# Patient Record
Sex: FEMALE | Race: WHITE | Employment: FULL TIME | URBAN - METROPOLITAN AREA
[De-identification: names, ages, dates, MRNs, and addresses within clinical notes are randomized per-mention and may not be internally consistent; named-entity substitution may affect disease eponyms.]

---

## 2018-10-13 ENCOUNTER — HOSPITAL ENCOUNTER (EMERGENCY)
Facility: CLINIC | Age: 18
Discharge: HOME | End: 2018-10-13
Attending: PHYSICIAN ASSISTANT | Admitting: PHYSICIAN ASSISTANT
Payer: COMMERCIAL

## 2018-10-13 VITALS
HEIGHT: 64 IN | HEART RATE: 111 BPM | BODY MASS INDEX: 50.02 KG/M2 | OXYGEN SATURATION: 99 % | WEIGHT: 293 LBS | DIASTOLIC BLOOD PRESSURE: 89 MMHG | SYSTOLIC BLOOD PRESSURE: 142 MMHG | TEMPERATURE: 97.9 F | RESPIRATION RATE: 20 BRPM

## 2018-10-13 DIAGNOSIS — K12.0 CANKER SORES ORAL: ICD-10-CM

## 2018-10-13 PROCEDURE — 99282 EMERGENCY DEPT VISIT SF MDM: CPT

## 2018-10-13 RX ORDER — DIPHENHYDRAMINE HYDROCHLORIDE AND LIDOCAINE HYDROCHLORIDE AND ALUMINUM HYDROXIDE AND MAGNESIUM HYDRO
5-10 KIT EVERY 6 HOURS PRN
Qty: 237 ML | Refills: 1 | Status: SHIPPED | OUTPATIENT
Start: 2018-10-13 | End: 2019-02-18

## 2018-10-13 ASSESSMENT — ENCOUNTER SYMPTOMS: WOUND: 1

## 2018-10-13 NOTE — ED AVS SNAPSHOT
Emergency Department    [address] [facility] [address]    Phone:  [phone]    Fax:  [phone]                                       Katarina Sierra   MRN: [id number]    Department:   Emergency Department   Date of Visit:  10/13/2018           After Visit Summary Signature Page     I have received my discharge instructions, and my questions have been answered. I have discussed any challenges I see with this plan with the nurse or doctor.    ..........................................................................................................................................  Patient/Patient Representative Signature      ..........................................................................................................................................  Patient Representative Print Name and Relationship to Patient    ..................................................               ................................................  Date                                   Time    ..........................................................................................................................................  Reviewed by Signature/Title    ...................................................              ..............................................  Date                                               Time          22EPIC Rev 08/18

## 2018-10-13 NOTE — ED AVS SNAPSHOT
Emergency Department    [address] [facility] [address]    Phone:  [phone]    Fax:  [phone]                                       Katarina Sierra   MRN: [id number]    Department:   Emergency Department   Date of Visit:  10/13/2018           Patient Information     Date Of Birth          2000        Your diagnoses for this visit were:     Canker sores oral        You were seen by Margaret Mendez PA-C.      Follow-up Information     Follow up with Primary Care Provider In 1 week.    Why:  As needed        Follow up with  Emergency Department.    Specialty:  EMERGENCY MEDICINE    Why:  As needed, If symptoms worsen    Contact information:    [address] [facility] [address] [phone]        Discharge Instructions         Understanding Canker Sores  Canker sores are small, painful sores inside the mouth. They occur most often on the tongue, gums, or insides of the cheeks. The medical term for canker sores is aphthous ulcers.  What causes a canker sore?  The exact cause of canker sores is not known, but they are linked to a number of conditions. These include:    An injury or irritation in the mouth, such as biting the inside of your cheek or braces rubbing    Allergy or sensitivity to certain foods or substances, such as citrus juice or some kinds of toothpaste    Poor nutrition    Emotional stress    Certain infections and illnesses  Canker sores tend to run in families.  What are the symptoms of a canker sore?  These are some common traits of canker sores:    Sores are open and grayish-yellow, surrounded by redness.    Sores are usually painful and sensitive to touch.    Canker sores may be preceded by a burning or tingling sensation a few hours to a few days before the sore appears.    Children and teens tend to get canker sores more often than adults.  How are canker sores treated?  Canker sores usually go away by themselves within 10 to  14 days. There is no cure for canker sores. Treatment focuses on relieving symptoms and shortening outbreaks. Treatments may include:    Prescription or over-the-counter skin treatments to apply to the sores. Steroids for your skin (topical) may protect the canker sores from further irritation and allow them to heal. Topical pain relief medicines may numb the area and make the sores less painful.    Certain types of toothpaste. These do not contain sodium lauryl sulfate. This type of toothpaste may prevent further aggravation of canker sores.    Oral prescription medicines. These are used for severe cases to help relieve symptoms.    Prescription or over-the-counter pain medicines. These help with discomfort.  What are the complications of a canker sore?  Mouth sores that seem to be canker sores can be signs of a more serious illness. If you have other signs of illness along with mouth sores, you should talk with a healthcare provider. Canker sores can be so painful that they interfere with talking, eating, or drinking.  When should I call my healthcare provider?  Call your healthcare provider right away if you have any of these:    Canker sores that don t go away after 2 weeks    Canker sores that come back more than 3 times a year    Canker sores that are larger than about a half-inch across    Fever of 100.4 F (38 C) or higher, or as directed    Pain that gets worse    You aren t able to eat or drink because of painful sores    Symptoms that don t get better, or symptoms that get worse    New symptoms   Date Last Reviewed: 5/1/2016 2000-2017 [employer]. [address] [place] [address], [place] [address]. All rights reserved. This information is not intended as a substitute for professional medical care. Always follow your healthcare professional's instructions.          24 Hour Appointment Hotline       To make an appointment at any [facility], call [phone] ([phone]). If you don't have  a family doctor or clinic, we will help you find one. [facility] clinics are conveniently located to serve the needs of you and your family.             Review of your medicines      START taking        Dose / Directions Last dose taken    magic mouthwash suspension (diphenhydrAMINE, lidocaine, aluminum-magnesium & simethicone) Susp compounding kit   Dose:  5-10 mL   Quantity:  237 mL        Swish and swallow 5-10 mLs in mouth every 6 hours as needed for mouth sores   Refills:  1                Prescriptions were sent or printed at these locations (1 Prescription)                   Other Prescriptions                Printed at Department/Unit printer (1 of 1)         magic mouthwash (FIRST-MOUTHWASH BLM) suspension                Orders Needing Specimen Collection     None      Pending Results     No orders found from 10/11/2018 to 10/14/2018.            Pending Culture Results     No orders found from 10/11/2018 to 10/14/2018.            Pending Results Instructions     If you had any lab results that were not finalized at the time of your Discharge, you can call the ED Lab Result RN at [phone]. You will be contacted by this team for any positive Lab results or changes in treatment. The nurses are available 7 days a week from 10A to 6:30P.  You can leave a message 24 hours per day and they will return your call.        Test Results From Your Hospital Stay               Clinical Quality Measure: Blood Pressure Screening     Your blood pressure was checked while you were in the emergency department today. The last reading we obtained was  BP: (!) 160/111 . Please read the guidelines below about what these numbers mean and what you should do about them.  If your systolic blood pressure (the top number) is less than 120 and your diastolic blood pressure (the bottom number) is less than 80, then your blood pressure is normal. There is nothing more that you need to do about it.  If your systolic blood pressure (the  "top number) is 120-139 or your diastolic blood pressure (the bottom number) is 80-89, your blood pressure may be higher than it should be. You should have your blood pressure rechecked within a year by a primary care provider.  If your systolic blood pressure (the top number) is 140 or greater or your diastolic blood pressure (the bottom number) is 90 or greater, you may have high blood pressure. High blood pressure is treatable, but if left untreated over time it can put you at risk for heart attack, stroke, or kidney failure. You should have your blood pressure rechecked by a primary care provider within the next 4 weeks.  If your provider in the emergency department today gave you specific instructions to follow-up with your doctor or provider even sooner than that, you should follow that instruction and not wait for up to 4 weeks for your follow-up visit.        Thank you for choosing [facility]       Thank you for choosing [facility] for your care. Our goal is always to provide you with excellent care. Hearing back from our patients is one way we can continue to improve our services. Please take a few minutes to complete the written survey that you may receive in the mail after you visit with us. Thank you!        [employer] Information     [employer] lets you send messages to your doctor, view your test results, renew your prescriptions, schedule appointments and more. To sign up, go to [email or web address] . Click on \"Log in\" on the left side of the screen, which will take you to the Welcome page. Then click on \"Sign up Now\" on the right side of the page.     You will be asked to enter the access code listed below, as well as some personal information. Please follow the directions to create your username and password.     Your access code is: [email or web address]  Expires: 2019  1:49 PM     Your access code will  in 90 days. If you need help or a new code, please call your [facility] clinic or [phone].      "   Care EveryWhere ID     This is your Care EveryWhere ID. This could be used by other organizations to access your [facility] medical records  [id number]        Equal Access to Services     SUSANNA SMITH : Elsa Blackmon, dora ray, carolee ayon, brendon bermudez. So [facility] [phone].    ATENCIÓN: Si habla español, tiene a sepulveda disposición servicios gratuitos de asistencia lingüística. Llame al [phone].    We comply with applicable federal civil rights laws and [place] laws. We do not discriminate on the basis of race, color, national origin, age, disability, sex, sexual orientation, or gender identity.            After Visit Summary       This is your record. Keep this with you and show to your community pharmacist(s) and doctor(s) at your next visit.

## 2018-10-13 NOTE — ED PROVIDER NOTES
"  History     Chief Complaint:  Mouth Lesions     HPI   Katarina Sierra is a 18 year old female, otherwise healthy, who presents to the emergency department today with a mouth lesion. The patient has a left cheek mouth sore with its onset being 2-3 days ago. The patient denies ever having a painful sore like this before. The patient denies any dietary or medication changes, fevers or chills. She denies having sores anywhere else on her body.     Allergies:  No Known Drug Allergies    Medications:    The patient is currently on no regular medications.    Past Medical History:    Obesity     Past Surgical History:    Cholecystectomy     Family History:    History reviewed. No pertinent family history.     Social History:  The patient was accompanied to the ED by her significant other.  Smoking Status: Never smoker   Smokeless Tobacco: Never used   Alcohol Use: No   Marital Status:       Review of Systems   Skin: Positive for wound (lesion of left cheek ).   All other systems reviewed and are negative.    Physical Exam   First Vitals:  Patient Vitals for the past 24 hrs:   BP Temp Temp src Pulse Resp SpO2 Height Weight   10/13/18 1401 - - - - - 99 % - -   10/13/18 1400 142/89 - - - - - - -   10/13/18 1337 - 97.9  F (36.6  C) Oral - - - - -   10/13/18 1331 (!) 160/111 - - 111 20 98 % 1.626 m (5' 4\") 142.4 kg (314 lb)       Physical Exam   Constitutional: Alert, attentive, GCS 15   Eyes: EOM intact. The pupils are equal, round, and reactive to light. No scleral icterus.  ENT:                                      Ears: The external ears are normal. TM's non-erythematous. External canals normal.    Nose: The external nose is normal.  Throat:  The oropharynx is normal. Mucus membranes are moist. 1cm shallow white ulcer to the mucosa of left cheek.   CV:  2+ radial pulse,   Pulm: No respiratory distress  MSK: Full ROM throughout   Neurological: Alert, attentive  Strength and sensation grossly intact   Skin: Skin " is warm and dry.  Psych: Normal mood and affect     Emergency Department Course   Emergency Department Course:  Nursing notes and vitals reviewed.  1340: I performed an exam of the patient as documented above.     Findings and plan explained to the Patient and significant other. Patient discharged home with instructions regarding supportive care, medications, and reasons to return. The importance of close follow-up was reviewed.    I personally answered all related questions prior to discharge.    Impression & Plan    Medical Decision Making:  Katarina Sierra is a 18 year old female presents with a mouth lesion that she noticed 2-3 days ago.    Differential included herpes simplex, lichen planus, oral candidiasis, oral leukoplakia, among others. Physical exam most consistent with aphthous ulcer.  Plan is for Magic mouthwash, avoiding spicy or irritant foods, and orajel for symptomatic relief.  Plans follow-up with primary care provider should lesion persist in the coming week.  All questions/concerns addressed prior to discharge home.      Diagnosis:    ICD-10-CM    1. Canker sores oral K12.0        Disposition:  discharged to home    Discharge Medications:  Discharge Medication List as of 10/13/2018  1:59 PM      START taking these medications    Details   magic mouthwash (FIRST-MOUTHWASH BLM) suspension Swish and swallow 5-10 mLs in mouth every 6 hours as needed for mouth sores, Disp-237 mL, R-1, Local Print           Mike Kline  10/13/2018    EMERGENCY DEPARTMENT  Scribe Disclosure:  I, Mike Kline, am serving as a scribe at 1:34 PM on 10/13/2018 to document services personally performed by Margaret Mendez PA-C based on my observations and the provider's statements to me.        Margaret Mendez PA-C  10/13/18 1523

## 2018-10-13 NOTE — DISCHARGE INSTRUCTIONS
Understanding Canker Sores  Canker sores are small, painful sores inside the mouth. They occur most often on the tongue, gums, or insides of the cheeks. The medical term for canker sores is aphthous ulcers.  What causes a canker sore?  The exact cause of canker sores is not known, but they are linked to a number of conditions. These include:    An injury or irritation in the mouth, such as biting the inside of your cheek or braces rubbing    Allergy or sensitivity to certain foods or substances, such as citrus juice or some kinds of toothpaste    Poor nutrition    Emotional stress    Certain infections and illnesses  Canker sores tend to run in families.  What are the symptoms of a canker sore?  These are some common traits of canker sores:    Sores are open and grayish-yellow, surrounded by redness.    Sores are usually painful and sensitive to touch.    Canker sores may be preceded by a burning or tingling sensation a few hours to a few days before the sore appears.    Children and teens tend to get canker sores more often than adults.  How are canker sores treated?  Canker sores usually go away by themselves within 10 to 14 days. There is no cure for canker sores. Treatment focuses on relieving symptoms and shortening outbreaks. Treatments may include:    Prescription or over-the-counter skin treatments to apply to the sores. Steroids for your skin (topical) may protect the canker sores from further irritation and allow them to heal. Topical pain relief medicines may numb the area and make the sores less painful.    Certain types of toothpaste. These do not contain sodium lauryl sulfate. This type of toothpaste may prevent further aggravation of canker sores.    Oral prescription medicines. These are used for severe cases to help relieve symptoms.    Prescription or over-the-counter pain medicines. These help with discomfort.  What are the complications of a canker sore?  Mouth sores that seem to be canker  sores can be signs of a more serious illness. If you have other signs of illness along with mouth sores, you should talk with a healthcare provider. Canker sores can be so painful that they interfere with talking, eating, or drinking.  When should I call my healthcare provider?  Call your healthcare provider right away if you have any of these:    Canker sores that don t go away after 2 weeks    Canker sores that come back more than 3 times a year    Canker sores that are larger than about a half-inch across    Fever of 100.4 F (38 C) or higher, or as directed    Pain that gets worse    You aren t able to eat or drink because of painful sores    Symptoms that don t get better, or symptoms that get worse    New symptoms   Date Last Reviewed: 5/1/2016 2000-2017 [employer]. [address] [place] [address], [place] [address]. All rights reserved. This information is not intended as a substitute for professional medical care. Always follow your healthcare professional's instructions.

## 2019-01-22 ENCOUNTER — HOSPITAL ENCOUNTER (EMERGENCY)
Facility: CLINIC | Age: 19
Discharge: HOME | End: 2019-01-22
Attending: EMERGENCY MEDICINE | Admitting: EMERGENCY MEDICINE
Payer: COMMERCIAL

## 2019-01-22 VITALS
DIASTOLIC BLOOD PRESSURE: 83 MMHG | OXYGEN SATURATION: 100 % | HEIGHT: 65 IN | TEMPERATURE: 97.9 F | HEART RATE: 115 BPM | RESPIRATION RATE: 16 BRPM | WEIGHT: 293 LBS | SYSTOLIC BLOOD PRESSURE: 145 MMHG | BODY MASS INDEX: 48.82 KG/M2

## 2019-01-22 DIAGNOSIS — F32.A DEPRESSION, UNSPECIFIED DEPRESSION TYPE: ICD-10-CM

## 2019-01-22 LAB
ANION GAP SERPL CALCULATED.3IONS-SCNC: 10 MMOL/L (ref 3–14)
BASOPHILS # BLD AUTO: 0 10E9/L (ref 0–0.2)
BASOPHILS NFR BLD AUTO: 0.2 %
BUN SERPL-MCNC: 7 MG/DL (ref 7–30)
CALCIUM SERPL-MCNC: 8.7 MG/DL (ref 8.5–10.1)
CHLORIDE SERPL-SCNC: 105 MMOL/L (ref 94–109)
CO2 SERPL-SCNC: 25 MMOL/L (ref 20–32)
CREAT SERPL-MCNC: 0.72 MG/DL (ref 0.52–1.04)
DIFFERENTIAL METHOD BLD: ABNORMAL
EOSINOPHIL # BLD AUTO: 0 10E9/L (ref 0–0.7)
EOSINOPHIL NFR BLD AUTO: 0.4 %
ERYTHROCYTE [DISTWIDTH] IN BLOOD BY AUTOMATED COUNT: 16 % (ref 10–15)
GFR SERPL CREATININE-BSD FRML MDRD: >90 ML/MIN/{1.73_M2}
GLUCOSE SERPL-MCNC: 89 MG/DL (ref 70–99)
HCT VFR BLD AUTO: 36.4 % (ref 35–47)
HGB BLD-MCNC: 11.5 G/DL (ref 11.7–15.7)
IMM GRANULOCYTES # BLD: 0 10E9/L (ref 0–0.4)
IMM GRANULOCYTES NFR BLD: 0.2 %
LYMPHOCYTES # BLD AUTO: 1.3 10E9/L (ref 0.8–5.3)
LYMPHOCYTES NFR BLD AUTO: 13.2 %
MCH RBC QN AUTO: 21.7 PG (ref 26.5–33)
MCHC RBC AUTO-ENTMCNC: 31.6 G/DL (ref 31.5–36.5)
MCV RBC AUTO: 69 FL (ref 78–100)
MONOCYTES # BLD AUTO: 0.6 10E9/L (ref 0–1.3)
MONOCYTES NFR BLD AUTO: 5.6 %
NEUTROPHILS # BLD AUTO: 8 10E9/L (ref 1.6–8.3)
NEUTROPHILS NFR BLD AUTO: 80.4 %
NRBC # BLD AUTO: 0 10*3/UL
NRBC BLD AUTO-RTO: 0 /100
PLATELET # BLD AUTO: 267 10E9/L (ref 150–450)
POTASSIUM SERPL-SCNC: 3.3 MMOL/L (ref 3.4–5.3)
RBC # BLD AUTO: 5.29 10E12/L (ref 3.8–5.2)
SODIUM SERPL-SCNC: 140 MMOL/L (ref 133–144)
WBC # BLD AUTO: 10 10E9/L (ref 4–11)

## 2019-01-22 PROCEDURE — 80048 BASIC METABOLIC PNL TOTAL CA: CPT | Performed by: EMERGENCY MEDICINE

## 2019-01-22 PROCEDURE — 93005 ELECTROCARDIOGRAM TRACING: CPT

## 2019-01-22 PROCEDURE — 99285 EMERGENCY DEPT VISIT HI MDM: CPT | Mod: 25

## 2019-01-22 PROCEDURE — 85025 COMPLETE CBC W/AUTO DIFF WBC: CPT | Performed by: EMERGENCY MEDICINE

## 2019-01-22 PROCEDURE — 90791 PSYCH DIAGNOSTIC EVALUATION: CPT

## 2019-01-22 ASSESSMENT — ENCOUNTER SYMPTOMS
APPETITE CHANGE: 1
NERVOUS/ANXIOUS: 1
FEVER: 0
SHORTNESS OF BREATH: 1
DYSPHORIC MOOD: 1

## 2019-01-22 ASSESSMENT — MIFFLIN-ST. JEOR: SCORE: 2210.14

## 2019-01-22 NOTE — ED PROVIDER NOTES
"  History     Chief Complaint:  Suicidal ideation     HPI   Katarina Sierra is a 18 year old female who presents with suicidal ideation. The patient has had depression since she was in middle school but for the past two weeks has had increased depression along with anxiety. She has noticed triggers from time spent on [employer] and other social stressors. She has never been to a psychiatrist or had any medications for her mental health issues. She has had thoughts of suicide via overdose but \"got worried\" about these thoughts, prompting her ED eval. She notes concern for decreased appetite and minimal PO intake over the past week 2/2 her depression. She also notes intermittent shortness of breath and minimal chest pain that she has had intermittently and feels is 2/2 anxiety. Upon arrival, the patient denies any fever or recent travel.     Allergies:  The patient has no known drug allergies.    Medications:    magic mouthwash    Past Medical History:    depression  Obesity  anxiety    Past Surgical History:    cholecystectomy    Family History:    No past pertinent family history.    Social History:  Marital Status:  Single   Smoker:   Never   Smokeless:   Never   Alcohol:   No   Drugs:   no  Lives at:   Unknown   Arrives with:   No one   Clinic:    Unknown   Work:   Unknown      Review of Systems   Constitutional: Positive for appetite change. Negative for fever.   Respiratory: Positive for shortness of breath.    Cardiovascular: Positive for chest pain.   Psychiatric/Behavioral: Positive for dysphoric mood and suicidal ideas. The patient is nervous/anxious.    All other systems reviewed and are negative.    Physical Exam     Patient Vitals for the past 24 hrs:   BP Temp Temp src Pulse Resp SpO2 Height Weight   01/22/19 1608 145/83 97.9  F (36.6  C) Temporal 115 16 100 % 1.646 m (5' 4.8\") 144.2 kg (318 lb)     Physical Exam  General/Appearance: appears stated age, well-groomed, appears comfortable, " elevated BMI  Eyes: EOMI, no scleral injection, no icterus  ENT: MMM  Neck: supple, nl ROM, no stiffness  Cardiovascular: tachy but regular, nl S1S2, no m/r/g, 2+ pulses in all 4 extremities, cap refill <2sec  Respiratory: CTAB, good air movement throughout, no wheezes/rhonchi/rales, no increased WOB, no retractions  Back: no lesions  GI: abd soft, non-distended, nttp,  no HSM, no rebound, no guarding, nl BS  MSK: MAZARIEGOS, good tone, no bony abnormality  Skin: warm and well-perfused, no rash, no edema, no ecchymosis, nl turgor  Neuro: GCS 15, alert and oriented, no gross focal neuro deficits  Psych:     Appearance: Casually dressed, appears stated age.     Attitude: Cooperative.     Eye Contact: Fair.     Speech: Regular rate rhythm normal volume and tone    Psychomotor Behavior: Normal    Mood: depressed    Affect: wnl    Thought Process: Intact/tangengial/flight of ideas    Thought Content: passive SI. - HI. - paranoia. - hallucinations    Insight: deferred    Judgment: deferred    Oriented to: Person place and time.     Attention Span and Concentration: Intact     Recent and Remote Memory: Intact  Heme: no petechia, no purpura, no active bleeding  Emergency Department Course   ECG:  Indication: shortness of breath and minimal chest pain  Time: 1650  Vent. Rate 91 bpm. DE interval 146. QRS duration 90. QT/QTc 364/447. P-R-T axis 25 35 37. Normal sinus rhythm. Normal ECG. Read time: 1655    Emergency Department Course:  (1617) I performed an exam of the patient as documented above.      Work up completed. Results above.    (1721) I consulted with DEC, regarding the patient's history and presentation here in the emergency department.     Findings and plan explained. Patient discharged home with instructions regarding supportive care, medications, and reasons to return. The importance of close follow-up was reviewed.     I personally reviewed the laboratory results with them and answered all related questions prior to  "discharge.    Impression & Plan    Medical Decision Making:  This patient is a 18-year-old history with reports of depression and anxiety, increased passive suicidality recently.  She is having thoughts of taking pills however has no true active plan.  She has forward thinking and states that she \"really does not want to die.\"  As she has no active suicidal ideation, recent attempt, I do not think labs are indicated.  As she was tachycardic with some atypical chest pain, shortness of breath, we did get an EKG which showed almost complete resolution of the tachycardia with a heart rate of 91 without any abnormalities.  She does not have specific risks for PE, ACS so I do not think these are indicated.  Our mental health worker assessed her and agrees that she is safe for outpatient management.  She is able to contract for safety.  She is excited to get started with the therapist and he was able to schedule an appointment for her.      Diagnosis:    ICD-10-CM    1. Depression, unspecified depression type F32.9 CBC with platelets differential     Basic metabolic panel       Disposition:  discharged to home    Discharge Medications:     Medication List      There are no discharge medications for this visit.       Scribe Disclosure:  I, Brandon Vigil, am serving as a scribe on 1/22/2019 at 4:17 PM to personally document services performed by Montse Mera* based on my observations and the provider's statements to me.     Brandon Vigil  1/22/2019    EMERGENCY DEPARTMENT       Montse Mera MD  01/22/19 1901    "

## 2019-01-22 NOTE — ED AVS SNAPSHOT
Emergency Department  [address] [facility] [address]  Phone:  [phone]  Fax:  [phone]                                    Katarina Sierra   MRN: [id number]    Department:   Emergency Department   Date of Visit:  1/22/2019           After Visit Summary Signature Page    I have received my discharge instructions, and my questions have been answered. I have discussed any challenges I see with this plan with the nurse or doctor.    ..........................................................................................................................................  Patient/Patient Representative Signature      ..........................................................................................................................................  Patient Representative Print Name and Relationship to Patient    ..................................................               ................................................  Date                                   Time    ..........................................................................................................................................  Reviewed by Signature/Title    ...................................................              ..............................................  Date                                               Time          22EPIC Rev 08/18

## 2019-01-26 LAB — INTERPRETATION ECG - MUSE: NORMAL

## 2019-02-18 ENCOUNTER — HOSPITAL ENCOUNTER (EMERGENCY)
Facility: CLINIC | Age: 19
Discharge: HOME | End: 2019-02-18
Attending: EMERGENCY MEDICINE | Admitting: EMERGENCY MEDICINE
Payer: COMMERCIAL

## 2019-02-18 VITALS
BODY MASS INDEX: 50.02 KG/M2 | TEMPERATURE: 97.6 F | RESPIRATION RATE: 16 BRPM | SYSTOLIC BLOOD PRESSURE: 124 MMHG | WEIGHT: 293 LBS | DIASTOLIC BLOOD PRESSURE: 68 MMHG | HEIGHT: 64 IN | OXYGEN SATURATION: 97 %

## 2019-02-18 DIAGNOSIS — F51.04 PSYCHOPHYSIOLOGICAL INSOMNIA: ICD-10-CM

## 2019-02-18 PROCEDURE — 99282 EMERGENCY DEPT VISIT SF MDM: CPT

## 2019-02-18 ASSESSMENT — ENCOUNTER SYMPTOMS
DYSPHORIC MOOD: 1
SLEEP DISTURBANCE: 1
NERVOUS/ANXIOUS: 1

## 2019-02-18 ASSESSMENT — MIFFLIN-ST. JEOR: SCORE: 2179.29

## 2019-02-18 NOTE — ED AVS SNAPSHOT
Emergency Department  [address] [facility] [address]  Phone:  [phone]  Fax:  [phone]                                    Katarina Sierra   MRN: [id number]    Department:   Emergency Department   Date of Visit:  2/18/2019           After Visit Summary Signature Page    I have received my discharge instructions, and my questions have been answered. I have discussed any challenges I see with this plan with the nurse or doctor.    ..........................................................................................................................................  Patient/Patient Representative Signature      ..........................................................................................................................................  Patient Representative Print Name and Relationship to Patient    ..................................................               ................................................  Date                                   Time    ..........................................................................................................................................  Reviewed by Signature/Title    ...................................................              ..............................................  Date                                               Time          22EPIC Rev 08/18

## 2019-02-19 NOTE — ED PROVIDER NOTES
"  History     Chief Complaint:  Insomnia     HPI   Katarina Sierra is a 18 year old female with history of depression, who presents for evaluation of insomnia for the past couple of days. The patient states her mind has been racing at night, making it difficult to fall asleep. She has not tried OTC sleep medications. She notes being diagnosed with depression and had been seeing a psychologist, but could no longer afford this counseling. She denies being suicidal. The patient states she is not currently employed and does not attend school. She is looking for a job, but otherwise does not have any hobbies besides watching [employer] videos.       Allergies:  No Known Drug Allergies      Medications:    The patient is not currently taking any prescribed medications.      Past Medical History:    Depressive disorder   Obesity     Past Surgical History:    Cholecystectomy     Family History:    History reviewed. No pertinent family history.      Social History:  Patient presents alone.  Smoking Status: never  Smokeless Tobacco: never  Alcohol Use: no  Drug Use: no    Marital Status:  Single [1]     Review of Systems   Psychiatric/Behavioral: Positive for dysphoric mood and sleep disturbance. Negative for suicidal ideas. The patient is nervous/anxious.    All other systems reviewed and are negative.      Physical Exam     Patient Vitals for the past 24 hrs:   BP Temp Temp src Heart Rate Resp SpO2 Height Weight   02/18/19 2300 124/68 -- -- -- -- 97 % -- --   02/18/19 2121 141/71 97.6  F (36.4  C) Temporal 93 16 99 % 1.626 m (5' 4\") 142.4 kg (314 lb)      Physical Exam  Nursing note and vitals reviewed.    Constitutional:  Appears well-developed, obese, comfortable.    HENT:    Nose normal.  No discharge.   Eyes:    Conjunctivae are normal without injection.  Cardiovascular:  Normal rate, regular rhythm with normal S1 and S2.      Normal heart sounds and peripheral pulses 2+ and equal.       No murmur or " frandy.  Pulmonary:  Effort normal and breath sounds clear to auscultation bilaterally   No respiratory distress.  No stridor.     No wheezes. No rales.     GI:    Soft. No distension and no mass. No tenderness.   Musculoskeletal:  Normal range of motion. No extremity deformity.     No edema and no tenderness.   Neurological:   Alert and oriented.  No obvious focal weakness.     GCS eye subscore is 4. GCS verbal subscore is 5.      GCS motor subscore is 6.   Skin:    Skin is warm and dry. No rash noted. No diaphoresis.      No erythema. No pallor.  No lesions.  Psychiatric:   Behavior is normal. Appropriate mood and affect.     Judgment and thought content normal.        Emergency Department Course     Emergency Department Course:  Nursing notes and vitals reviewed.  I entered the room.  I performed an exam of the patient as documented above.     I discussed the treatment plan with the patient. They expressed understanding of this plan and consented to discharge. They will be discharged home with instructions for care and follow up. In addition, the patient will return to the emergency department if their symptoms worsen, if new symptoms arise or if there is any concern.  All questions were answered.    Impression & Plan      Medical Decision Making:  Katarina Sierra is a 18 year old female who presents to the emergency department today for evaluation of insomnia. She has been unable to sleep the last few nights stating that her mind has been racing. She had been seeing a therapist in the past and has been dealing with some depression, but denies suicidal ideation. She has not been physically sick. She is not currently working, she is not going to school, she states her days are filled with sitting around watching [employer] videos all day. I told her that I would recommend some over-the-counter remedies for her insomnia, but she needs to get in with a primary care physician to get plugged back into  therapy. I strongly encouraged her to start exercising and to try to get out rather than being on her computer all day, which I think may be contributing to her insomnia.    Diagnosis:    ICD-10-CM    1. Psychophysiological insomnia F51.04      Disposition:   The patient was discharged to home.    Tylenol PM and/or melatonin as needed for insomnia.  Recommend you start exercising and going for walks, start a new hobby.  Set up an appointment with a primary care physician later this week for a recheck and to have a physical.    Discharge Medications:  There are no discharge medications for this patient.    Scribe Disclosure:  I, Slick Garnett, am serving as a scribe at 10:28 PM on 2/18/2019 to document services personally performed by Angela Bauer MD, based on my observations and the provider's statements to me.    EMERGENCY DEPARTMENT       Angela Bauer MD  02/19/19 0008

## 2019-02-19 NOTE — DISCHARGE INSTRUCTIONS
Tylenol PM and/or melatonin as needed for insomnia.  Recommend you start exercising and going for walks, start a new hobby.  Set up an appointment with a primary care physician later this week for a recheck and to have a physical.

## 2019-03-24 ENCOUNTER — HOSPITAL ENCOUNTER (EMERGENCY)
Facility: CLINIC | Age: 19
Discharge: HOME | End: 2019-03-24
Attending: EMERGENCY MEDICINE | Admitting: EMERGENCY MEDICINE
Payer: COMMERCIAL

## 2019-03-24 VITALS
BODY MASS INDEX: 50.02 KG/M2 | RESPIRATION RATE: 18 BRPM | WEIGHT: 293 LBS | DIASTOLIC BLOOD PRESSURE: 88 MMHG | HEIGHT: 64 IN | TEMPERATURE: 98 F | SYSTOLIC BLOOD PRESSURE: 178 MMHG

## 2019-03-24 DIAGNOSIS — S09.90XA CLOSED HEAD INJURY, INITIAL ENCOUNTER: ICD-10-CM

## 2019-03-24 PROCEDURE — 99282 EMERGENCY DEPT VISIT SF MDM: CPT

## 2019-03-24 ASSESSMENT — ENCOUNTER SYMPTOMS
FATIGUE: 1
PHOTOPHOBIA: 1
NUMBNESS: 1
HEADACHES: 1
NECK PAIN: 1

## 2019-03-24 ASSESSMENT — MIFFLIN-ST. JEOR: SCORE: 2174.29

## 2019-03-24 NOTE — ED AVS SNAPSHOT
Emergency Department  [address] [facility] [address]  Phone:  [phone]  Fax:  [phone]                                    Katarina Sierra   MRN: [id number]    Department:   Emergency Department   Date of Visit:  3/24/2019           After Visit Summary Signature Page    I have received my discharge instructions, and my questions have been answered. I have discussed any challenges I see with this plan with the nurse or doctor.    ..........................................................................................................................................  Patient/Patient Representative Signature      ..........................................................................................................................................  Patient Representative Print Name and Relationship to Patient    ..................................................               ................................................  Date                                   Time    ..........................................................................................................................................  Reviewed by Signature/Title    ...................................................              ..............................................  Date                                               Time          22EPIC Rev 08/18

## 2019-03-25 NOTE — DISCHARGE INSTRUCTIONS
Discharge Instructions  Head Injury    You have been seen today for a head injury. Your evaluation included a history and physical examination. You may have had a CT (CAT) scan performed, though most head injuries do not require a scan. Based on this evaluation, your provider today does not feel that your head injury is serious.    Generally, every Emergency Department visit should have a follow-up clinic visit with either a primary or a specialty clinic/provider. Please follow-up as instructed by your emergency provider today.  Return to the Emergency Department if:  You are confused or you are not acting right.  Your headache gets worse or you start to have a really bad headache even with your recommended treatment plan.  You vomit (throw up) more than once.  You have a seizure.  You have trouble walking.  You have weakness or paralysis (cannot move) in an arm or a leg.  You have blood or fluid coming from your ears or nose.  You have new symptoms or anything that worries you.    Sleeping:  It is okay for you to sleep, but someone should wake you up if instructed by your provider, and someone should check on you at your usual time to wake up.     Activity:  Do not drive for at least 24 hours.  Do not drive if you have dizzy spells or trouble concentrating, or remembering things.  Do not return to any contact sports until cleared by your regular provider.     MORE INFORMATION:    Concussion:  A concussion is a minor head injury that may cause temporary problems with the way the brain works. Although concussions are important, they are generally not an emergency or a reason that a person needs to be hospitalized. Some concussion symptoms include confusion, amnesia (forgetful), nausea (sick to your stomach) and vomiting (throwing up), dizziness, fatigue, memory or concentration problems, irritability and sleep problems. For most people, concussions are mild and temporary but some will have more severe and persistent  symptoms that require on-going care and treatment.  CT Scans: Your evaluation today may have included a CT scan (CAT scan) to look for things like bleeding or a skull fracture (broken bone).  CT scans involve radiation and too many CT scans can cause serious health problems like cancer, especially in children.  Because of this, your provider may not have ordered a CT scan today if they think you are at low risk for a serious or life threatening problem.    If you were given a prescription for medicine here today, be sure to read all of the information (including the package insert) that comes with your prescription.  This will include important information about the medicine, its side effects, and any warnings that you need to know about.  The pharmacist who fills the prescription can provide more information and answer questions you may have about the medicine.  If you have questions or concerns that the pharmacist cannot address, please call or return to the Emergency Department.     Remember that you can always come back to the Emergency Department if you are not able to see your regular provider in the amount of time listed above, if you get any new symptoms, or if there is anything that worries you.
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
## 2019-04-03 ENCOUNTER — NURSE TRIAGE (OUTPATIENT)
Dept: NURSING | Facility: CLINIC | Age: 19
End: 2019-04-03

## 2019-04-03 NOTE — TELEPHONE ENCOUNTER
"Katarina is calling and feels that her tonsils are swollen which started today.  Sore throat is present and does not know about a fever.  Denies earache.  Denies pus on back of throat.      Reason for Disposition    [1] Sore throat is the only symptom AND [2] present > 48 hours    Additional Information    Negative: Severe difficulty breathing (e.g., struggling for each breath, speaks in single words, stridor)    Negative: Sounds like a life-threatening emergency to the triager    Negative: [1] Drooling or spitting out saliva (because can't swallow) AND [2] normal breathing    Negative: Unable to open mouth completely    Negative: [1] Difficulty breathing AND [2] not severe    Negative: Fever > 104 F (40 C)    Negative: [1] Refuses to drink anything AND [2] for > 12 hours    Negative: [1] Drinking very little AND [2] dehydration suspected (e.g., no urine > 12 hours, very dry mouth, very lightheaded)    Negative: Patient sounds very sick or weak to the triager    Negative: SEVERE (e.g., excruciating) throat pain    Negative: [1] Pus on tonsils (back of throat) AND [2]  fever AND [3] swollen neck lymph nodes (\"glands\")    Negative: [1] Rash AND [2] widespread (especially chest and abdomen)    Negative: Earache also present    Negative: Fever present > 3 days (72 hours)    Negative: Diabetes mellitus or weak immune system (e.g., HIV positive, cancer chemo, splenectomy, organ transplant)    Negative: History of rheumatic fever    Negative: [1] Adult is leaving on a trip AND [2] requests an antibiotic NOW    Negative: [1] Positive throat culture or rapid strep test (according to lab, PCP, caller, etc.) AND [2] NO  standing order to call in prescription for antibiotic    Negative: [1] Exposure to family member (or spouse or boyfriend/girlfriend) with test-proven strep AND [2] within last 10 days    Protocols used: SORE THROAT-ADULT-      "

## 2019-04-20 ENCOUNTER — HOSPITAL ENCOUNTER (EMERGENCY)
Facility: CLINIC | Age: 19
Discharge: HOME | End: 2019-04-20
Attending: NURSE PRACTITIONER | Admitting: NURSE PRACTITIONER
Payer: COMMERCIAL

## 2019-04-20 VITALS
WEIGHT: 293 LBS | DIASTOLIC BLOOD PRESSURE: 97 MMHG | RESPIRATION RATE: 18 BRPM | OXYGEN SATURATION: 98 % | HEIGHT: 64 IN | HEART RATE: 109 BPM | BODY MASS INDEX: 50.02 KG/M2 | TEMPERATURE: 98.9 F | SYSTOLIC BLOOD PRESSURE: 135 MMHG

## 2019-04-20 DIAGNOSIS — D17.30 LIPOMA OF SKIN AND SUBCUTANEOUS TISSUE: ICD-10-CM

## 2019-04-20 PROCEDURE — 99282 EMERGENCY DEPT VISIT SF MDM: CPT

## 2019-04-20 ASSESSMENT — ENCOUNTER SYMPTOMS
FEVER: 0
NUMBNESS: 0

## 2019-04-20 ASSESSMENT — MIFFLIN-ST. JEOR: SCORE: 2169.76

## 2019-04-20 NOTE — ED AVS SNAPSHOT
Emergency Department  [address] [facility] [address]  Phone:  [phone]  Fax:  [phone]                                    Katarina Sierra   MRN: [id number]    Department:   Emergency Department   Date of Visit:  4/20/2019           After Visit Summary Signature Page    I have received my discharge instructions, and my questions have been answered. I have discussed any challenges I see with this plan with the nurse or doctor.    ..........................................................................................................................................  Patient/Patient Representative Signature      ..........................................................................................................................................  Patient Representative Print Name and Relationship to Patient    ..................................................               ................................................  Date                                   Time    ..........................................................................................................................................  Reviewed by Signature/Title    ...................................................              ..............................................  Date                                               Time          22EPIC Rev 08/18

## 2019-04-21 NOTE — ED PROVIDER NOTES
"  History     Chief Complaint:  \"Bump\" on her left elbow    The history is provided by the patient.      Katarina Sierra is a 19 year old female who presents concerning for a \"bump\" to the radial dunn aspect of her left elbow. She denies any fever, change in sensation, or personal history of blood clots.    Allergies:  No Known Drug Allergies    Medications:    Medications reviewed. No current medications.     Past Medical History:    Depression  Obesity    Past Surgical History:    Cholecystectomy    Family History:    No past pertinent family history.    Social History:  Alcohol Use: Positive  Marital Status: Single      Review of Systems   Constitutional: Negative for fever.   Musculoskeletal: Negative for arthralgias and myalgias.   Skin:        \"Bump\" to her elbow   Neurological: Negative for weakness and numbness.     Physical Exam     Patient Vitals for the past 24 hrs:   BP Temp Temp src Pulse Resp SpO2 Height Weight   04/20/19 2213 (!) 135/97 98.9  F (37.2  C) Oral 109 18 98 % 1.626 m (5' 4\") 142 kg (313 lb)     Physical Exam   Constitutional:  Sitting up in bed comfortably, non-toxic appearing.   Head: Head moves freely with normal range of motion.   ENT: Oropharynx is clear and moist.   Eyes: Conjunctivae pink. EOMs intact.   Neck: Normal range of motion.   Cardiovascular: Regular rate and rhythm. Normal heart sounds. No concerning murmur. Intact distal pulses: radial pulses 2+ on the right, 2+ on the left.   Pulmonary/Chest: No respiratory distress. No decreased breath sounds. No wheezes. No rhonchi. No rales. Lungs clear throughout.   Abdominal: Soft. Non-tender. No rebound, no guarding.   Musculoskeletal: No peripheral edema. Distal capillary refill and sensation intact.   Neurological: Oriented to person, place, and time. No focal deficits.   Skin: Skin is warm and normal in color. No rash noted.  Soft, non-fluctuant, non-tender area at the left antecubital fossa with no surrounding " erythema or heat to touch. No generalized edema of the arm.   Emergency Department Course     Emergency Department Course:   Nursing notes and vitals reviewed.   I performed an exam of the patient as documented above. I personally answered all related questions prior to discharge, anticipatory guidance given.    Impression & Plan      Medical Decision Makin years old female who presents with concern over a soft fleshy bump on her left arm, palmar aspect near the antecubital fossa. No erythema or heat to touch. No generalized edema of the arm. Exam with no concerns for DVT, abscess, vascular aneurysm, infectious process. Exam is most consistent with lipoma. We discussed reasons to return here and need for PCP follow up. She is amenable to plan.     Diagnosis:    ICD-10-CM   1. Lipoma of skin and subcutaneous tissue D17.30     Disposition:   The patient is discharged to home.    Scribe Disclosure:  I, Alan Correia, am serving as a scribe at 10:15 PM on 2019 to document services personally performed by Princess Crocker NP based on my observations and the provider's statements to me.     EMERGENCY DEPARTMENT       Princess Crocker, LINDA MAX  [id number]

## 2019-04-22 ASSESSMENT — ENCOUNTER SYMPTOMS
MYALGIAS: 0
WEAKNESS: 0
ARTHRALGIAS: 0

## 2019-05-22 ENCOUNTER — HOSPITAL ENCOUNTER (EMERGENCY)
Facility: CLINIC | Age: 19
Discharge: HOME | End: 2019-05-22
Attending: NURSE PRACTITIONER | Admitting: NURSE PRACTITIONER
Payer: COMMERCIAL

## 2019-05-22 VITALS
HEIGHT: 64 IN | OXYGEN SATURATION: 100 % | BODY MASS INDEX: 50.02 KG/M2 | TEMPERATURE: 98.3 F | SYSTOLIC BLOOD PRESSURE: 135 MMHG | WEIGHT: 293 LBS | DIASTOLIC BLOOD PRESSURE: 87 MMHG | HEART RATE: 95 BPM | RESPIRATION RATE: 16 BRPM

## 2019-05-22 DIAGNOSIS — R11.0 NAUSEA: ICD-10-CM

## 2019-05-22 DIAGNOSIS — R10.13 ABDOMINAL PAIN, EPIGASTRIC: ICD-10-CM

## 2019-05-22 PROCEDURE — 25000125 ZZHC RX 250: Performed by: NURSE PRACTITIONER

## 2019-05-22 PROCEDURE — 99283 EMERGENCY DEPT VISIT LOW MDM: CPT

## 2019-05-22 PROCEDURE — 25000132 ZZH RX MED GY IP 250 OP 250 PS 637: Performed by: NURSE PRACTITIONER

## 2019-05-22 PROCEDURE — 25000131 ZZH RX MED GY IP 250 OP 636 PS 637: Performed by: NURSE PRACTITIONER

## 2019-05-22 RX ORDER — ONDANSETRON 4 MG/1
4 TABLET, ORALLY DISINTEGRATING ORAL EVERY 8 HOURS PRN
Qty: 10 TABLET | Refills: 0 | Status: SHIPPED | OUTPATIENT
Start: 2019-05-22 | End: 2019-05-25

## 2019-05-22 RX ORDER — ONDANSETRON 4 MG/1
4 TABLET, ORALLY DISINTEGRATING ORAL ONCE
Status: COMPLETED | OUTPATIENT
Start: 2019-05-22 | End: 2019-05-22

## 2019-05-22 RX ADMIN — LIDOCAINE HYDROCHLORIDE 30 ML: 20 SOLUTION ORAL; TOPICAL at 18:20

## 2019-05-22 RX ADMIN — ONDANSETRON 4 MG: 4 TABLET, ORALLY DISINTEGRATING ORAL at 18:20

## 2019-05-22 ASSESSMENT — ENCOUNTER SYMPTOMS
DIFFICULTY URINATING: 0
HEMATURIA: 0
ABDOMINAL PAIN: 1
DYSURIA: 0
NAUSEA: 1
FEVER: 0
VOMITING: 0

## 2019-05-22 ASSESSMENT — MIFFLIN-ST. JEOR: SCORE: 2192.44

## 2019-05-22 NOTE — ED PROVIDER NOTES
History   Chief Complaint:  Abdominal pain    HPI   Katarina Seirra is a 19 year old female, who presents with significant other to the ED for evaluation of abdominal pain. The patient reports being seen at the [facility] earlier today for treatment of pharyngitis, UTI, and possible chlamydia and gonorrhea infection. At [facility] she was treated with Azithromycin and Ceftriaxone, and sent home with Macrobid and Prilosec. The patient presents to the ED this evening for increasing abdominal pain that was present at her earlier visit, but not at the current severity. The patient states the pain is present in her upper abdomen and has been feeling nauseous, without vomiting. She notes having some vaginal discharge as well. The patient denies any fever, back pain, urine abnormalities, or any other acute symptoms.      Laboratory: 5/22/19  CBC: HGB 11.1 (L), o/w WNL (WBC 10.1, )  Mononucleosis Screen: Negative  Lipase: 15  CMP: WNL (Creatinine 0.62)  UA: Urine Clarity Turbid, Leukocyte Esterase Urine Small, WBC/HPF 18 (H), Bacteria Occasional, Mucous Urine Present, o/w Negative  HCG Qualitative: Negative    Allergies:  No known drug allergies    Medications:    Macrobid  Prilosec    Past Medical History:    Depressive disorder  Obesity    Past Surgical History:    Cholecystectomy    Family History:    History reviewed. No pertinent family history.     Social History:  Smoking status: Never  Alcohol use: No  Marital Status:  Single [1]    Review of Systems   Constitutional: Negative for fever.   Gastrointestinal: Positive for abdominal pain and nausea. Negative for vomiting.   Genitourinary: Positive for vaginal discharge. Negative for difficulty urinating, dysuria, hematuria and urgency.   All other systems reviewed and are negative.    Physical Exam     Patient Vitals for the past 24 hrs:   BP Temp Temp src Pulse Heart Rate Resp SpO2 Height Weight   05/22/19 1906 135/87 -- -- -- 78 16 100 %  "-- --   05/22/19 1742 165/71 98.3  F (36.8  C) Oral 95 -- 20 96 % 1.626 m (5' 4\") 144.2 kg (318 lb)     Physical Exam  Nursing notes reviewed. Vitals reviewed.  General: Alert. Well kept.  Eyes:  Conjunctiva non-injected, non-icteric.  Neck/Throat: Moist mucous membranes, oropharynx without erythema or exudate. Tonsils 2+. Uvula midline. No trismus.  No cervical lymphadenopathy.  Normal voice.  Cardiac: Regular rhythm. Normal heart sounds with no murmur/rubs/click.   Pulmonary: Clear and equal breath sounds bilaterally. No crackles/rales. No wheezing  Abdomen: Soft. Non-distended. Tender to palpation over the epigastrium. No RUQ or LUQ pain. No masses. No guarding or rebound.  Musculoskeletal: Normal gross range of motion of all 4 extremities.    Neurological: Alert and oriented x4.   Skin: Warm and dry without rashes or petechiae. Normal appearance of visualized exposed skin.  Psych: Affect normal. Good eye contact.    Emergency Department Course   Interventions:  1820: GI Cocktail - Maalox 15 mL, Viscous Lidocaine 15 mL, 30 mL suspension PO   1820: Zofran 4 mg IV    Emergency Department Course:  Past medical records, nursing notes, and vitals reviewed.  1805: I performed an exam of the patient and obtained history, as documented above.    1855: I rechecked the patient. Explained findings to patient. She has had complete resolution of symptoms.     Findings and plan explained to the Patient. Patient discharged home with instructions regarding supportive care, medications, and reasons to return. The importance of close follow-up was reviewed.     Impression & Plan    Medical Decision Making:  Katarina Sierra is a 19 year old female who presents for evaluation of epigastric pain. The patient notes onset of nausea and epigastric discomfort after being discharged from [facility] Emergency Department where she received ceftriaxone and azithromycin. A broad differential diagnosis was considered including " colitis, appendicitis, intestinal cramping, pyelonephritis, UTI, kidney stone, constipation, diverticulitis, volvulus, ileus, obstruction, aortic aneurysm, pregnancy (ectopic or intrauterine), ovarian cyst (enlarged or ruptured), ovarian torsion, PID, etc as possibilities. On examination, she has tenderness over the epigastrium without left or right upper quadrant tenderness. She is afebrile and has no lower abdominal tenderness. Labwork from prior visit reviewed. She was given a dose of Ceftriaxone and Azithromycin at [facility], which I think is likely the cause of her epigastric discomfort. She was given a dose of Zofran and a GI cocktail in the emergency department here, with complete relief of symptoms. There is no indication of further workup with the normal workup completed earlier and full resolution of symptoms here. I will discharge her with a short course of Zofran for ongoing nausea and she will return to the emergency department with return of symptoms, fevers, vomiting, inability to tolerate fluids, or any other concerns.  Diagnosis:    ICD-10-CM    1. Nausea R11.0    2. Abdominal pain, epigastric R10.13      Disposition:  Discharged to home.    Discharge Medications:     Medication List      Started    ondansetron 4 MG ODT tab  Commonly known as:  ZOFRAN ODT  4 mg, Oral, EVERY 8 HOURS PRN          Benjamin Presley  5/22/2019    EMERGENCY DEPARTMENT  Scribe Disclosure:  Benjamin ROBERTS, am serving as a scribe at 6:05 PM on 5/22/2019 to document services personally performed by Taylor Guzman CNP based on my observations and the provider's statements to me.       Taylor Guzman CNP  05/22/19 2033

## 2019-05-22 NOTE — ED AVS SNAPSHOT
Emergency Department  [address] [facility] [address]  Phone:  [phone]  Fax:  [phone]                                    Katarina Sierra   MRN: [id number]    Department:   Emergency Department   Date of Visit:  5/22/2019           After Visit Summary Signature Page    I have received my discharge instructions, and my questions have been answered. I have discussed any challenges I see with this plan with the nurse or doctor.    ..........................................................................................................................................  Patient/Patient Representative Signature      ..........................................................................................................................................  Patient Representative Print Name and Relationship to Patient    ..................................................               ................................................  Date                                   Time    ..........................................................................................................................................  Reviewed by Signature/Title    ...................................................              ..............................................  Date                                               Time          22EPIC Rev 08/18

## 2019-05-22 NOTE — ED TRIAGE NOTES
Pt was just discharged from [facility] ER and after leaving developed abd pain. Pt was seen in [facility] for throat pain and abd pain

## 2020-03-17 ENCOUNTER — VIRTUAL VISIT (OUTPATIENT)
Dept: FAMILY MEDICINE | Facility: OTHER | Age: 20
End: 2020-03-17

## 2020-03-18 NOTE — PROGRESS NOTES
"Date: 2020 17:51:02  Clinician: Felicia Bates  Clinician NPI: [id number]  Patient: Katarina Santos  Patient : 2000  Patient Address: [address] [place] [address]  Patient Phone: [phone]  Visit Protocol: URI  Patient Summary:  Katarina is a 20 year old ( : 2000 ) female who initiated a Visit for COVID-19 (Coronavirus) evaluation and screening. When asked the question \"Please sign me up to receive news, health information and promotions. \", Katarina responded \"Yes\".    Katarina states her symptoms started gradually 3-6 days ago.   Her symptoms consist of a cough, nasal congestion, tooth pain, malaise, a headache, rhinitis, and facial pain or pressure.   Symptom details     Nasal secretions: The color of her mucus is yellow.    Cough: Katarina coughs a few times an hour and her cough is not more bothersome at night. Phlegm does not come into her throat when she coughs. She believes her cough is caused by post-nasal drip.     Facial pain or pressure: The facial pain or pressure feels worse when bending over or leaning forward.     Headache: She states the headache is moderate (4-6 on a 10 point pain scale).     Tooth pain: The tooth pain is caused by a cavity, recent dental work, or other mouth problems.      Katarina denies having wheezing, sore throat, fever, chills, ear pain, and myalgias. She also denies taking antibiotic medication for the symptoms, having a sinus infection within the past year, having recent facial or sinus surgery in the past 60 days, and double sickening (worsening symptoms after initial improvement). She is not experiencing dyspnea.   Precipitating events  She has recently been exposed to someone with influenza. Katarina has been in close contact with the following high risk individuals: people with asthma, heart disease or diabetes.   Pertinent COVID-19 (Coronavirus) information  Katarina has not traveled internationally or to " the areas where COVID-19 (Coronavirus) is widespread in the last 14 days before the start of her symptoms.   Katarina has not had a close contact with a laboratory-confirmed COVID-19 patient within 14 days of symptom onset. She also has not had a close contact with a suspected COVID-19 patient within 14 days of symptom onset.   Katarina is not a healthcare worker and does not work in a healthcare facility.   Pertinent medical history  Katarina does not get yeast infections when she takes antibiotics.   Katarina does not need a return to work/school note.   Weight: 319 lbs   Katarina does not smoke or use smokeless tobacco.   She denies pregnancy and denies breastfeeding. She has menstruated in the past month.   Weight: 319 lbs    MEDICATIONS: No current medications, ALLERGIES: NKDA  Clinician Response:  Dear Katarina,  Based on the information provided, you have viral sinusitis, also known as a sinus infection. Sinus infections are caused by bacteria or a virus and symptoms are almost always identical. The difference between the 2 types of infections is timing.  Sinus infections start as viral infections and symptoms improve on their own in about 7 days. If symptoms have not improved after 7 days or have even worsened, a bacterial infection may have developed.  Medication information  Because you have a viral infection, antibiotics will not help you get better. Treating a viral infection with antibiotics could actually make you feel worse.  I am prescribing:     Fluticasone 50 mcg/actuation nasal spray. Inhale 2 sprays in each nostril 1 time per day; after 1 week, may adjust to 1 - 2 sprays in each nostril 1 time per day. This medication takes several days to start working, so keep taking it even if it doesn't help right away. There are no refills with this prescription.   Self care  The following tips will keep you as comfortable as possible while you recover:     Rest    Drink plenty of water and other  liquids    Take a hot shower to loosen congestion    Take a spoonful of honey to reduce your cough     When to seek care  See your dentist if you suspect your tooth pain is a dental problem.  Please be seen in a clinic or urgent care if new symptoms develop, or symptoms become worse.  COVID-19 (Coronavirus) General Information  With the increase in the number of COVID-19 (Coronavirus) cases, we understand you may have some questions. Below is some helpful information on COVID-19 (Coronavirus).  How can I protect myself and others from the COVID-19 (Coronavirus)?  Because there is currently no vaccine to prevent infection, the best way to protect yourself is to avoid being exposed to this virus. Put distance between yourself and other people if COVID-19 (Coronavirus) is spreading in your community. The virus is thought to spread mainly from person-to-person.     Between people who are in close contact with one another (within about 6 about) for prolonged period (10 minutes or longer).    Through respiratory droplets produced when an infected person coughs or sneezes.     The CDC recommends the following additional steps to protect yourself and others:     Wash your hands often with soap and water for at least 20 seconds, especially after blowing your nose, coughing, or sneezing; going to the bathroom; and before eating or preparing food.  Use an alcohol-based hand  that contains at least 60 percent alcohol if soap and water are not available.        Avoid touching your eyes, nose and mouth with unwashed hands.    Avoid close contact with people who are sick.    Stay home when you are sick.    Cover your cough or sneeze with a tissue, then throw the tissue in the trash.    Clean and disinfect frequently touched objects and surfaces.     You can help stop COVID-19 (Coronavirus) by knowing the signs and symptoms:     Fever    Cough    Shortness of breath     Contact your healthcare provider if   Develop  symptoms   AND   Have been in close contact with a person known to have COVID-19 (Coronavirus) or live in or have recently traveled from an area with ongoing spread of COVID-19 (Coronavirus). Call ahead before you go to a doctor's office or emergency room. Tell them about your recent travel and your symptoms.   For the most up to date information, visit the CDC's website.  Steps to help prevent the spread of COVID-19 (Coronavirus) if you are sick  If you are sick with COVID-19 (Coronavirus) or suspect you are infected with the virus that causes COVID-19 (Coronavirus), follow the steps below to help prevent the disease from spreading&nbsp;to people in your home and community.     Stay home except to get medical care. Home isolation may be started in consultation with your healthcare clinician.    Separate yourself from other people and animals in your home.    Call ahead before visiting your doctor if you have a medical appointment.    Wear a facemask when you are around other people.    Cover your cough and sneezes.    Clean your hands often.    Avoid sharing personal household items.    Clean and disinfect frequently touched objects and surfaces everyday.    You will need to have someone drop off medications or household supplies (if needed) at your house without coming inside or in contact with you or others living in your house.    Monitor your symptoms and seek prompt medical care if your illness is worsening (e.g. Difficulty breathing).    Discontinue home isolation only in consultation with your healthcare provider.     For more detailed and up to date information on what to do if you are sick, visit this link: What to Do If You Are Sick With Coronavirus Disease 2019 (COVID-19).  Do I need to be tested for COVID-19 (Coronavirus)?     At this time, the limited number of tests available are controlled by the state and local health departments and are being reserved for more seriously ill patients, those with  "known exposure to confirmed patients, and those with recent travel (within 14 days) to countries with high rates of COVID-19 (Coronavirus).    Decisions on which patients receive testing will be based on the local spread of COVID-19 (Coronavirus) as well as the symptoms. Your healthcare provider will make the final decision on whether you should be tested.    In the meantime, if you have concerns that you may have been exposed, it is reasonable to practice \"social distancing.\"&nbsp; If you are ill with a cold or flu-like illness, please monitor your symptoms and reach out to your healthcare provider if your symptoms worsen.    For more up to date information, visit this link: COVID-19 (Coronavirus) Frequently Asked Questions and Answers.      Diagnosis: Viral sinusitis  Diagnosis ICD: J01.90  Prescription: fluticasone 50 mcg/actuation nasal spray,suspension 1 120 spray aerosol with adapter (grams), 30 days supply. Inhale 2 sprays in each nostril 1 time per day; after 1 week, may adjust to 1 - 2 sprays in each nostril 1 time per day.. Refills: 0, Refill as needed: no, Allow substitutions: yes  "